# Patient Record
Sex: MALE | Race: WHITE | NOT HISPANIC OR LATINO | Employment: OTHER | URBAN - METROPOLITAN AREA
[De-identification: names, ages, dates, MRNs, and addresses within clinical notes are randomized per-mention and may not be internally consistent; named-entity substitution may affect disease eponyms.]

---

## 2017-09-09 ENCOUNTER — HOSPITAL ENCOUNTER (EMERGENCY)
Facility: HOSPITAL | Age: 82
Discharge: HOME | End: 2017-09-09
Attending: EMERGENCY MEDICINE | Admitting: EMERGENCY MEDICINE

## 2017-09-09 ENCOUNTER — APPOINTMENT (OUTPATIENT)
Dept: CT IMAGING | Facility: HOSPITAL | Age: 82
End: 2017-09-09

## 2017-09-09 VITALS
TEMPERATURE: 97.7 F | RESPIRATION RATE: 16 BRPM | HEIGHT: 66 IN | HEART RATE: 74 BPM | BODY MASS INDEX: 22.5 KG/M2 | OXYGEN SATURATION: 94 % | DIASTOLIC BLOOD PRESSURE: 70 MMHG | SYSTOLIC BLOOD PRESSURE: 159 MMHG | WEIGHT: 140 LBS
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
## 2017-09-09 DIAGNOSIS — S00.03XA SCALP CONTUSION: ICD-10-CM

## 2017-09-09 PROCEDURE — 72125 CT NECK SPINE W/O DYE: CPT

## 2017-09-09 PROCEDURE — 99284 EMERGENCY DEPT VISIT MOD MDM: CPT

## 2017-09-09 PROCEDURE — 70450 CT HEAD/BRAIN W/O DYE: CPT

## 2017-09-09 RX ORDER — RISPERIDONE 0.5 MG/1
0.5 TABLET ORAL NIGHTLY
COMMUNITY

## 2017-09-09 RX ORDER — TRAZODONE HYDROCHLORIDE 100 MG/1
100 TABLET ORAL NIGHTLY
COMMUNITY

## 2017-09-09 RX ORDER — FERROUS SULFATE 325(65) MG
325 TABLET ORAL
COMMUNITY

## 2017-09-09 RX ORDER — IMIQUIMOD 12.5 MG/.25G
CREAM TOPICAL 3 TIMES WEEKLY
COMMUNITY

## 2017-09-09 RX ORDER — ACETAMINOPHEN 325 MG/1
650 TABLET ORAL EVERY 4 HOURS PRN
COMMUNITY

## 2017-09-09 NOTE — ED NOTES
Report from [facility] [phone] [facility] side Unit:      Pt has alzheimers, fell and hit head on wall.  No LOC, no emesis reported.  Pt now with oxygen sats 85% RA.  No oxygen available at facility.      Pt's POA is his wife, Marimar, [phone], and she has been notified of pt fall.    Vitals:  /67 HR 72  RR 16  T 98.4    Pt is DNR.     Vani Liriano RN  09/09/17 2316

## 2017-09-09 NOTE — ED PROVIDER NOTES
" EMERGENCY DEPARTMENT ENCOUNTER    CHIEF COMPLAINT  Chief Complaint: fall  History given by: pt, EMS  History limited by: Alzheimer's  Room Number: 13/13  PMD: Zack Hogue MD      HPI:  Pt is a 85 y.o. male with Alzheimer's who is brought to the s/p fall PTA. Per EMS, the NH reports the pt fell backwards and hit his head with no reported LOC.    Duration:  PTA  Onset: sudden  Timing: single fall  Location: N/A  Radiation: none  Quality: \"fall\"  Intensity/Severity: moderate  Progression: unchanged  Associated Symptoms: none  Aggravating Factors: unknown  Alleviating Factors: unknown  Previous Episodes: unknown  Treatment before arrival: unknown    PAST MEDICAL HISTORY  Active Ambulatory Problems     Diagnosis Date Noted   • No Active Ambulatory Problems     Resolved Ambulatory Problems     Diagnosis Date Noted   • No Resolved Ambulatory Problems     Past Medical History:   Diagnosis Date   • Alzheimer disease    • Disease of thyroid gland    • Hypertension        PAST SURGICAL HISTORY  Past Surgical History:   Procedure Laterality Date   • BYPASS GRAFT         FAMILY HISTORY  Family History   Problem Relation Age of Onset   • Heart attack Father    • Heart attack Brother        SOCIAL HISTORY  Social History     Social History   • Marital status:      Spouse name: N/A   • Number of children: N/A   • Years of education: N/A     Occupational History   • Not on file.     Social History Main Topics   • Smoking status: Never Smoker   • Smokeless tobacco: Not on file   • Alcohol use No   • Drug use: Not on file   • Sexual activity: Not on file     Other Topics Concern   • Not on file     Social History Narrative   • No narrative on file       ALLERGIES  Review of patient's allergies indicates no known allergies.    REVIEW OF SYSTEMS  Review of Systems   Reason unable to perform ROS: Alzheimer's.       PHYSICAL EXAM  ED Triage Vitals   Temp Heart Rate Resp BP SpO2   09/09/17 0203 09/09/17 0203 09/09/17 0203 " 09/09/17 0203 09/09/17 0203   97.7 °F (36.5 °C) 64 18 138/60 98 %      Temp src Heart Rate Source Patient Position BP Location FiO2 (%)   -- 09/09/17 0203 -- -- --    Monitor          Physical Exam   Constitutional: He is oriented to person, place, and time and well-developed, well-nourished, and in no distress.   Pinkish material around mouth which may ormay not be blood   HENT:   Head: Normocephalic and atraumatic.   Eyes: EOM are normal. Pupils are equal, round, and reactive to light.   Neck: Normal range of motion. Neck supple.   In C-collar   Cardiovascular: Normal rate, regular rhythm and normal heart sounds.    Pulmonary/Chest: Effort normal and breath sounds normal. No respiratory distress.   Abdominal: Soft. There is no tenderness. There is no rebound and no guarding.   Musculoskeletal: Normal range of motion. He exhibits no edema.   Legs are equal and unrotated   Neurological: He is alert and oriented to person, place, and time. He has normal sensation and normal strength.   Skin: Skin is warm and dry.   Psychiatric: Mood and affect normal.   Nursing note and vitals reviewed.      RADIOLOGY  CT Head Without Contrast   Preliminary Result   No definite acute intracranial pathology.           ----------------------------------------------------------------       CT CERVICAL SPINE WITHOUT CONTRAST.       TECHNIQUE: Routine axial images of the cervical spine with coronal and   sagittal reconstructed images.       HISTORY: Trauma, neck pain.       COMPARISON:  No prior studies for comparison.       FINDINGS:    Vertebral body height is normal, no acute fracture is seen. Multilevel   loss of intervertebral disc height and spurring, most severe at C6-7.       Prevertebral soft tissue is unremarkable.            IMPRESSION:    No acute fracture of the cervical spine.                              CT Cervical Spine Without Contrast   Preliminary Result   No definite acute intracranial pathology.            ----------------------------------------------------------------       CT CERVICAL SPINE WITHOUT CONTRAST.       TECHNIQUE: Routine axial images of the cervical spine with coronal and   sagittal reconstructed images.       HISTORY: Trauma, neck pain.       COMPARISON:  No prior studies for comparison.       FINDINGS:    Vertebral body height is normal, no acute fracture is seen. Multilevel   loss of intervertebral disc height and spurring, most severe at C6-7.       Prevertebral soft tissue is unremarkable.            IMPRESSION:    No acute fracture of the cervical spine.                                   I ordered the above noted radiological studies. Interpreted by radiologist. Reviewed by me in PACS.       PROCEDURES  Procedures       PROGRESS AND CONSULTS  ED Course     0213 - Ordered CT C-Spine and CT Head for further evaluation.    0314 - Pt rechecked. Pt is asleep, family now on bedside. Notified family of CT results which were all negative. Discussed plan to discharge the pt. Family understands and agrees with plan, all questions addressed.    MEDICAL DECISION MAKING  Results were reviewed/discussed with the patient and they were also made aware of online access. Pt also made aware that some labs, such as cultures, will not be resulted during ER visit and follow up with PMD is necessary.     MDM  Number of Diagnoses or Management Options     Amount and/or Complexity of Data Reviewed  Tests in the radiology section of CPT®: ordered and reviewed (CT - nothing acute)           DIAGNOSIS  Final diagnoses:   Fall, initial encounter   Scalp contusion       DISPOSITION  DISCHARGE    Patient discharged in stable condition.    Reviewed implications of results, diagnosis, meds, responsibility to follow up, warning signs and symptoms of possible worsening, potential complications and reasons to return to ER.    Patient/Family voiced understanding of above instructions.    Discussed plan for discharge, as there is no  emergent indication for admission.  Pt/family is agreeable and understands need for follow up and repeat testing.  Pt is aware that discharge does not mean that nothing is wrong but it indicates no emergency is present that requires admission and they must continue care with follow-up as given below or physician of their choice.     FOLLOW-UP  Zack Hogue MD  [address] [address]  [phone]    Call           Medication List      Changed          levothyroxine 50 MCG tablet   Commonly known as:  SYNTHROID, LEVOTHROID   Take 1 tablet by mouth daily.   What changed:  how much to take               Latest Documented Vital Signs:  As of 3:37 AM  BP- 162/68 HR- 63 Temp- 97.7 °F (36.5 °C) O2 sat- 96%    --  Documentation assistance provided by danielle Wakefield for Dr. Kelley.  Information recorded by the scribe was done at my direction and has been verified and validated by me.     Efrain Wakefield  09/09/17 0337       Valente Kelley MD  09/09/17 0756

## 2017-10-01 ENCOUNTER — HOSPITAL ENCOUNTER (EMERGENCY)
Facility: HOSPITAL | Age: 82
Discharge: HOME | End: 2017-10-02
Attending: EMERGENCY MEDICINE | Admitting: EMERGENCY MEDICINE

## 2017-10-01 ENCOUNTER — APPOINTMENT (OUTPATIENT)
Dept: CT IMAGING | Facility: HOSPITAL | Age: 82
End: 2017-10-01

## 2017-10-01 VITALS
BODY MASS INDEX: 24.11 KG/M2 | RESPIRATION RATE: 18 BRPM | DIASTOLIC BLOOD PRESSURE: 76 MMHG | HEIGHT: 66 IN | HEART RATE: 70 BPM | SYSTOLIC BLOOD PRESSURE: 150 MMHG | TEMPERATURE: 98.3 F | WEIGHT: 150 LBS | OXYGEN SATURATION: 97 %

## 2017-10-01 DIAGNOSIS — S09.90XA INJURY OF HEAD, INITIAL ENCOUNTER: ICD-10-CM

## 2017-10-01 DIAGNOSIS — M54.2 NECK PAIN: ICD-10-CM
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
## 2017-10-01 PROCEDURE — 99284 EMERGENCY DEPT VISIT MOD MDM: CPT

## 2017-10-01 PROCEDURE — 72125 CT NECK SPINE W/O DYE: CPT

## 2017-10-01 PROCEDURE — 70450 CT HEAD/BRAIN W/O DYE: CPT

## 2017-10-02 NOTE — ED PROVIDER NOTES
"EMERGENCY DEPARTMENT ENCOUNTER    CHIEF COMPLAINT  Chief Complaint: Head injury   History given by: pt  History limited by: Dementia   Room Number: 05/05  PMD: Zack Hogue MD      HPI:  Pt is a 85 y.o. male who presents from [facility] via EMS complaining of head injury that occurred post-status witnessed mechanical fall on carpet that happened PTA while pt was ambulating. Nursing home denies pt experiencing LOC or having any other complications from the fall. Family is present on evaluation who states that pt has a hx of skin cancer with a skin removal procedure to the scalp that occurred recently. Per family, pt was baseline when they arrived to ER and pt is currently sleeping on examination. Per family, pt's gait is unsteady/ \"crooked\" and physical therapy plan to place pt on walker for ambulation assistance. Pt received his night-time medications PTA according to the nursing facility. Per Nursing Staff, pt has no complaints upon arrival to ED.     Duration: PTA  Onset: sudden   Timing: constant  Location: head   Radiation: None reported   Quality: \"pain\"  Intensity/Severity: moderate   Progression: unknown   Associated Symptoms: unknown   Aggravating Factors: unknown   Alleviating Factors: unknown   Previous Episodes: unknown   Treatment before arrival: Pt received his night-time medications PTA according to the nursing facility.     PAST MEDICAL HISTORY  Active Ambulatory Problems     Diagnosis Date Noted   • No Active Ambulatory Problems     Resolved Ambulatory Problems     Diagnosis Date Noted   • No Resolved Ambulatory Problems     Past Medical History:   Diagnosis Date   • Alzheimer disease    • Disease of thyroid gland    • Hypertension        PAST SURGICAL HISTORY  Past Surgical History:   Procedure Laterality Date   • BYPASS GRAFT         FAMILY HISTORY  Family History   Problem Relation Age of Onset   • Heart attack Father    • Heart attack Brother        SOCIAL HISTORY  Social " History     Social History   • Marital status:      Spouse name: N/A   • Number of children: N/A   • Years of education: N/A     Occupational History   • Not on file.     Social History Main Topics   • Smoking status: Never Smoker   • Smokeless tobacco: Not on file   • Alcohol use No   • Drug use: Not on file   • Sexual activity: Not on file     Other Topics Concern   • Not on file     Social History Narrative       ALLERGIES  Review of patient's allergies indicates no known allergies.    REVIEW OF SYSTEMS  Review of Systems   Unable to perform ROS: Dementia   Neurological: Negative for syncope.       PHYSICAL EXAM  ED Triage Vitals   Temp Heart Rate Resp BP SpO2   10/01/17 2103 10/01/17 2103 10/01/17 2103 10/01/17 2103 10/01/17 2103   98.3 °F (36.8 °C) 64 16 151/75 95 %      Temp src Heart Rate Source Patient Position BP Location FiO2 (%)   -- -- -- -- --              Physical Exam   Constitutional: He is well-developed, well-nourished, and in no distress.   Pt is demented and this caused the exam to be limited    HENT:   Head: Normocephalic and atraumatic.   Eyes: EOM are normal. Pupils are equal, round, and reactive to light.   Neck: Normal range of motion. Neck supple.   Cardiovascular: Normal rate, regular rhythm and normal heart sounds.    Pulmonary/Chest: Effort normal and breath sounds normal. No respiratory distress.   Abdominal: Soft. There is no tenderness. There is no rebound and no guarding.   Musculoskeletal: Normal range of motion. He exhibits no edema or tenderness.   Moves all extremities well with FROM, no tenderness on evaluation   Limited C-Spine exxam due to pt's dementia    Neurological: He is alert. He has normal sensation and normal strength.   Skin: Skin is warm and dry.   5 cm superficial abrasion to frontal scalp with mild soft-tissue swelling    Psychiatric: Mood and affect normal.   Nursing note and vitals reviewed.    RADIOLOGY  CT Cervical Spine Without Contrast   Final Result    1. No acute fracture       This report was finalized on 10/1/2017 11:06 PM by Matthew Samayoa MD.          CT Head Without Contrast   Final Result   1. No acute intracranial abnormality.                           This study was performed with techniques to keep radiation doses as low   as reasonably achievable (ALARA). Individualized dose reduction   techniques using automated exposure control or adjustment of mA and/or   kV according to the patient size were employed.        This report was finalized on 10/1/2017 10:59 PM by Matthew Samayoa MD.               I ordered the above noted radiological studies. Interpreted by radiologist. Reviewed by me in PACS.       PROCEDURES  Procedures      PROGRESS AND CONSULTS  ED Course     2230: Ordered CT Cervical spine and CT Head for further evaluation.    2338: Discussed pt's case with Dr. Kelley who evaluated pt and agree with tx/ disposition plan.     2352: Discussed pt's radiology results with pt's family and discussed discharge plans with family. Pt is awake on re-evaluation, pt states he is ready to return to the nursing home. Pt/ family understand and agree to plan, all questions addressed at time.     MEDICAL DECISION MAKING  Results were reviewed/discussed with the patient and they were also made aware of online access. Pt also made aware that some labs, such as cultures, will not be resulted during ER visit and follow up with PMD is necessary.     MDM  Number of Diagnoses or Management Options     Amount and/or Complexity of Data Reviewed  Tests in the radiology section of CPT®: ordered and reviewed (CT Cervical spine Impression   1. No acute fracture    CT Head: Negative Acute    )  Decide to obtain previous medical records or to obtain history from someone other than the patient: yes  Review and summarize past medical records: yes (Pt has a hx of dementia. Pt was seen in ED on 9/9/17 for mechanical fall with a negative work-up. )  Independent visualization of  images, tracings, or specimens: yes           DIAGNOSIS  Final diagnoses:   Fall, initial encounter   Injury of head, initial encounter   Neck pain       DISPOSITION  DISCHARGE    Patient discharged in stable condition.    Reviewed implications of results, diagnosis, meds, responsibility to follow up, warning signs and symptoms of possible worsening, potential complications and reasons to return to ER.    Patient/Family voiced understanding of above instructions.    Discussed plan for discharge, as there is no emergent indication for admission.  Pt/family is agreeable and understands need for follow up and repeat testing.  Pt is aware that discharge does not mean that nothing is wrong but it indicates no emergency is present that requires admission and they must continue care with follow-up as given below or physician of their choice.     FOLLOW-UP  No follow-up provider specified.       Medication List      Changed          levothyroxine 50 MCG tablet   Commonly known as:  SYNTHROID, LEVOTHROID   Take 1 tablet by mouth daily.   What changed:  how much to take               Latest Documented Vital Signs:  As of 2:38 AM  BP- 150/76 HR- 70 Temp- 98.3 °F (36.8 °C) O2 sat- 97%    --  Documentation assistance provided by danielle Glynn for Trung ROUSSEAU.  Information recorded by the scribe was done at my direction and has been verified and validated by me.     Kwabena Glynn  10/01/17 5290       Kwabena Glynn  10/01/17 8383       ERASTO Armstrong III  10/02/17 0395

## 2017-10-02 NOTE — ED PROVIDER NOTES
Pt presents from NH secondary to loss of balance fall that occurred PTA. Pt struck his head on carpeted floor. Pt has a h/o falls and baseline dementia. Upon exam, there is an abrasion to pt's forehead. CT Head was unremarkable and vitals are stable. Pt will be discharged back to NH.     I supervised care provided by the midlevel provider. Mainor Nino (ONELIA).   We have discussed this patient's history, physical exam, and treatment plan.   I have reviewed the note and personally saw and examined the patient and agree with the plan of care.    Documentation assistance provided by danielle Castrejon for Valente Kelley.  Information recorded by the scribe was done at my direction and has been verified and validated by me.       Trina Castrejon  10/01/17 5775       Valente Kelley MD  10/02/17 6479

## 2017-10-02 NOTE — ED NOTES
Pt to Room 5 with c/o fall occurring this afternoon while he was ambulating.  Per patients family, he resides at [facility] in the [facility] which is primarily for dementia patients.  Per facility, the patient had a witnessed fall on carpet, and does have a circular area noted to the top of his head.  Pt does not verbalize any complaints at this time and did receive his night time medications prior to arrival to the ED.  Pt denies fever, chills, n/v/d, blurred vision, chest pain, abdominal pain, loss in control of bowel/bladder.  Bed in low position, call light within reach, side rails up X2.      Lisa Peterson RN  10/01/17 9921

## 2017-10-02 NOTE — ED NOTES
Witnessed fall at [facility]. Pt has abrasion on top of head. Pt denies any pain or discomforts.     Elsie García RN  10/01/17 0570